# Patient Record
Sex: FEMALE | Race: WHITE | NOT HISPANIC OR LATINO | URBAN - METROPOLITAN AREA
[De-identification: names, ages, dates, MRNs, and addresses within clinical notes are randomized per-mention and may not be internally consistent; named-entity substitution may affect disease eponyms.]

---

## 2018-09-24 ENCOUNTER — FOLLOW UP ESTABLISHED (OUTPATIENT)
Dept: URBAN - METROPOLITAN AREA CLINIC 51 | Facility: CLINIC | Age: 78
End: 2018-09-24
Payer: MEDICARE

## 2018-09-24 DIAGNOSIS — H33.321 ROUND HOLE OF RETINA, RIGHT EYE: ICD-10-CM

## 2018-09-24 DIAGNOSIS — H02.834 DERMATOCHALASIS OF LEFT UPPER LID: ICD-10-CM

## 2018-09-24 DIAGNOSIS — H04.123 TEAR FILM INSUFFICIENCY OF BILATERAL LACRIMAL GLANDS: ICD-10-CM

## 2018-09-24 DIAGNOSIS — H52.4 PRESBYOPIA: ICD-10-CM

## 2018-09-24 PROCEDURE — 92004 COMPRE OPH EXAM NEW PT 1/>: CPT | Performed by: OPTOMETRIST

## 2018-09-24 RX ORDER — ATENOLOL 25 MG/1
25 MG TABLET ORAL
Refills: 0 | Status: ACTIVE
Start: 2018-09-24

## 2018-09-24 ASSESSMENT — INTRAOCULAR PRESSURE
OD: 17
OS: 17

## 2018-09-24 ASSESSMENT — KERATOMETRY
OD: 43.30
OS: 43.33

## 2018-09-24 ASSESSMENT — VISUAL ACUITY
OS: 20/20
OD: 20/20

## 2020-01-13 ENCOUNTER — FOLLOW UP ESTABLISHED (OUTPATIENT)
Dept: URBAN - METROPOLITAN AREA CLINIC 51 | Facility: CLINIC | Age: 80
End: 2020-01-13
Payer: MEDICARE

## 2020-01-13 DIAGNOSIS — H02.831 DERMATOCHALASIS OF RIGHT UPPER EYELID: ICD-10-CM

## 2020-01-13 DIAGNOSIS — H43.813 VITREOUS DEGENERATION, BILATERAL: ICD-10-CM

## 2020-01-13 DIAGNOSIS — H35.363 DRUSEN (DEGENERATIVE) OF MACULA, BILATERAL: ICD-10-CM

## 2020-01-13 PROCEDURE — 92134 CPTRZ OPH DX IMG PST SGM RTA: CPT | Performed by: OPTOMETRIST

## 2020-01-13 PROCEDURE — 92014 COMPRE OPH EXAM EST PT 1/>: CPT | Performed by: OPTOMETRIST

## 2020-01-13 ASSESSMENT — INTRAOCULAR PRESSURE
OD: 17
OS: 16

## 2020-01-13 ASSESSMENT — KERATOMETRY
OS: 43.02
OD: 43.21

## 2020-01-13 ASSESSMENT — VISUAL ACUITY
OD: 20/25
OS: 20/25

## 2022-01-31 ENCOUNTER — OFFICE VISIT (OUTPATIENT)
Dept: URBAN - METROPOLITAN AREA CLINIC 51 | Facility: CLINIC | Age: 82
End: 2022-01-31
Payer: MEDICARE

## 2022-01-31 DIAGNOSIS — H34.8110 CENTRAL RETINAL VEIN OCCLUSION W/ MACULAR EDEMA, RIGHT EYE: Primary | ICD-10-CM

## 2022-01-31 DIAGNOSIS — H43.313 VITREOUS MEMBRANES AND STRANDS, BILATERAL: ICD-10-CM

## 2022-01-31 DIAGNOSIS — Z96.1 PRESENCE OF INTRAOCULAR LENS: ICD-10-CM

## 2022-01-31 PROCEDURE — 92134 CPTRZ OPH DX IMG PST SGM RTA: CPT | Performed by: OPTOMETRIST

## 2022-01-31 PROCEDURE — 99214 OFFICE O/P EST MOD 30 MIN: CPT | Performed by: OPTOMETRIST

## 2022-01-31 ASSESSMENT — KERATOMETRY
OD: 43.27
OS: 43.19

## 2022-01-31 ASSESSMENT — INTRAOCULAR PRESSURE
OS: 16
OD: 16

## 2022-01-31 ASSESSMENT — VISUAL ACUITY
OD: 20/200
OS: 20/25

## 2022-01-31 NOTE — IMPRESSION/PLAN
Impression: Drusen (degenerative) of macula, bilateral
*mild posterior pole OU. Plan: Discussed finding with patient. No treatment is necessary at this time. OCT MAC performed and reviewed. Monitor annually with OCT MAC.

## 2022-01-31 NOTE — IMPRESSION/PLAN
Impression: Round hole of retina, right eye with laser repair/scar temporally *INTACT, repair tear OD Plan: Warning signs of retinal tear and detachment discussed with patient. To return to clinic STAT if any change in symptoms consistent with RT or RD.

## 2022-02-01 ENCOUNTER — OFFICE VISIT (OUTPATIENT)
Dept: URBAN - METROPOLITAN AREA CLINIC 51 | Facility: CLINIC | Age: 82
End: 2022-02-01
Payer: MEDICARE

## 2022-02-01 DIAGNOSIS — E11.9 TYPE 2 DIABETES MELLITUS WITHOUT COMPLICATIONS: ICD-10-CM

## 2022-02-01 PROCEDURE — 92134 CPTRZ OPH DX IMG PST SGM RTA: CPT | Performed by: OPHTHALMOLOGY

## 2022-02-01 PROCEDURE — 99204 OFFICE O/P NEW MOD 45 MIN: CPT | Performed by: OPHTHALMOLOGY

## 2022-02-01 PROCEDURE — 92235 FLUORESCEIN ANGRPH MLTIFRAME: CPT | Performed by: OPHTHALMOLOGY

## 2022-02-01 PROCEDURE — 67028 INJECTION EYE DRUG: CPT | Performed by: OPHTHALMOLOGY

## 2022-02-01 ASSESSMENT — INTRAOCULAR PRESSURE
OS: 18
OD: 17

## 2022-02-01 NOTE — IMPRESSION/PLAN
Impression: Type 2 diabetes mellitus without complications: D13.1. Plan: no sign of retinopathy, no laser indicated. no macular edema. counseled re DM control.

## 2022-02-01 NOTE — IMPRESSION/PLAN
Impression: Central retinal vein occlusion, right eye, with macular edema: H34.8110. Plan: eval and testing confirms CRVO c edema. explained in detail with patient, recommend injection tx. disc r/b/a's, pt elects to proceed with Avastin OD today and will return monthly. urged HTN control RTC 1 month ND Avastin OD

## 2022-03-10 ENCOUNTER — PROCEDURE (OUTPATIENT)
Dept: URBAN - METROPOLITAN AREA CLINIC 44 | Facility: CLINIC | Age: 82
End: 2022-03-10
Payer: MEDICARE

## 2022-03-10 PROCEDURE — 67028 INJECTION EYE DRUG: CPT | Performed by: OPHTHALMOLOGY

## 2022-03-10 PROCEDURE — 92134 CPTRZ OPH DX IMG PST SGM RTA: CPT | Performed by: OPHTHALMOLOGY

## 2022-03-10 ASSESSMENT — INTRAOCULAR PRESSURE
OS: 17
OD: 20

## 2022-04-14 ENCOUNTER — PROCEDURE (OUTPATIENT)
Dept: URBAN - METROPOLITAN AREA CLINIC 44 | Facility: CLINIC | Age: 82
End: 2022-04-14
Payer: MEDICARE

## 2022-04-14 DIAGNOSIS — H34.8110 CENTRAL RETINAL VEIN OCCLUSION, RIGHT EYE, WITH MACULAR EDEMA: Primary | ICD-10-CM

## 2022-04-14 PROCEDURE — 67028 INJECTION EYE DRUG: CPT | Performed by: OPHTHALMOLOGY

## 2022-04-14 PROCEDURE — 92134 CPTRZ OPH DX IMG PST SGM RTA: CPT | Performed by: OPHTHALMOLOGY

## 2022-04-14 NOTE — IMPRESSION/PLAN
Impression: Central retinal vein occlusion, right eye, with macular edema: H34.8110. Plan: inject Avastin OD  **persistent edema, obtain auth for ozurdex RTC 1 month poss FA OD

## 2022-05-12 ENCOUNTER — OFFICE VISIT (OUTPATIENT)
Dept: URBAN - METROPOLITAN AREA CLINIC 44 | Facility: CLINIC | Age: 82
End: 2022-05-12
Payer: MEDICARE

## 2022-05-12 DIAGNOSIS — E11.9 TYPE 2 DIABETES MELLITUS WITHOUT COMPLICATIONS: ICD-10-CM

## 2022-05-12 DIAGNOSIS — H43.813 VITREOUS DEGENERATION, BILATERAL: ICD-10-CM

## 2022-05-12 DIAGNOSIS — H34.8110 CENTRAL RETINAL VEIN OCCLUSION, RIGHT EYE, WITH MACULAR EDEMA: Primary | ICD-10-CM

## 2022-05-12 PROCEDURE — 67028 INJECTION EYE DRUG: CPT | Performed by: OPHTHALMOLOGY

## 2022-05-12 PROCEDURE — 92134 CPTRZ OPH DX IMG PST SGM RTA: CPT | Performed by: OPHTHALMOLOGY

## 2022-05-12 PROCEDURE — 99214 OFFICE O/P EST MOD 30 MIN: CPT | Performed by: OPHTHALMOLOGY

## 2022-05-12 PROCEDURE — 92235 FLUORESCEIN ANGRPH MLTIFRAME: CPT | Performed by: OPHTHALMOLOGY

## 2022-05-12 ASSESSMENT — INTRAOCULAR PRESSURE
OD: 21
OS: 16

## 2022-05-12 NOTE — IMPRESSION/PLAN
Impression: Type 2 diabetes mellitus without complications: E44.1. Plan: no sign of retinopathy, no laser indicated. no macular edema. counseled re DM control.

## 2022-05-12 NOTE — IMPRESSION/PLAN
Impression: Central retinal vein occlusion, right eye, with macular edema: H34.8110. Plan: edema persists OD.  disc options, will inject Ozurdex OD today and return at same interval

RTC 6 weeks

## 2022-06-23 ENCOUNTER — OFFICE VISIT (OUTPATIENT)
Dept: URBAN - METROPOLITAN AREA CLINIC 44 | Facility: CLINIC | Age: 82
End: 2022-06-23
Payer: MEDICARE

## 2022-06-23 DIAGNOSIS — H43.813 VITREOUS DEGENERATION, BILATERAL: ICD-10-CM

## 2022-06-23 DIAGNOSIS — H34.8110 CENTRAL RETINAL VEIN OCCLUSION, RIGHT EYE, WITH MACULAR EDEMA: Primary | ICD-10-CM

## 2022-06-23 DIAGNOSIS — E11.9 TYPE 2 DIABETES MELLITUS WITHOUT COMPLICATIONS: ICD-10-CM

## 2022-06-23 PROCEDURE — 92134 CPTRZ OPH DX IMG PST SGM RTA: CPT | Performed by: OPHTHALMOLOGY

## 2022-06-23 PROCEDURE — 99214 OFFICE O/P EST MOD 30 MIN: CPT | Performed by: OPHTHALMOLOGY

## 2022-06-23 PROCEDURE — 67028 INJECTION EYE DRUG: CPT | Performed by: OPHTHALMOLOGY

## 2022-06-23 ASSESSMENT — INTRAOCULAR PRESSURE
OS: 18
OD: 20

## 2022-06-23 NOTE — IMPRESSION/PLAN
Impression: Type 2 diabetes mellitus without complications: I34.2. Plan: no sign of retinopathy, no laser indicated. no macular edema. counseled re DM control.

## 2022-06-23 NOTE — IMPRESSION/PLAN
Impression: Central retinal vein occlusion, right eye, with macular edema: H34.8110.
s/p Ozurdex OD 5/22 Plan: edema resolved s/p Ozurdex injection 5/22.  will repeat Avastin OD today and return at same interval 

RTC 6 week ND Avastin OD

## 2022-08-04 ENCOUNTER — OFFICE VISIT (OUTPATIENT)
Dept: URBAN - METROPOLITAN AREA CLINIC 44 | Facility: CLINIC | Age: 82
End: 2022-08-04
Payer: MEDICARE

## 2022-08-04 DIAGNOSIS — H34.8110 CENTRAL RETINAL VEIN OCCLUSION, RIGHT EYE, WITH MACULAR EDEMA: Primary | ICD-10-CM

## 2022-08-04 PROCEDURE — 67028 INJECTION EYE DRUG: CPT | Performed by: OPHTHALMOLOGY

## 2022-08-04 PROCEDURE — 92134 CPTRZ OPH DX IMG PST SGM RTA: CPT | Performed by: OPHTHALMOLOGY

## 2022-08-04 ASSESSMENT — INTRAOCULAR PRESSURE
OS: 19
OD: 18

## 2022-08-04 NOTE — IMPRESSION/PLAN
Impression: Central retinal vein occlusion, right eye, with macular edema: H34.8110.
s/p Ozurdex OD 5/22 Plan: inject Avastin OD

RTC 6 week poss FA OD

## 2022-09-15 ENCOUNTER — PROCEDURE (OUTPATIENT)
Dept: URBAN - METROPOLITAN AREA CLINIC 44 | Facility: CLINIC | Age: 82
End: 2022-09-15
Payer: MEDICARE

## 2022-09-15 DIAGNOSIS — H43.813 VITREOUS DEGENERATION, BILATERAL: ICD-10-CM

## 2022-09-15 DIAGNOSIS — H34.8110 CENTRAL RETINAL VEIN OCCLUSION, RIGHT EYE, WITH MACULAR EDEMA: Primary | ICD-10-CM

## 2022-09-15 DIAGNOSIS — E11.9 TYPE 2 DIABETES MELLITUS WITHOUT COMPLICATIONS: ICD-10-CM

## 2022-09-15 PROCEDURE — 67028 INJECTION EYE DRUG: CPT | Performed by: OPHTHALMOLOGY

## 2022-09-15 PROCEDURE — 92235 FLUORESCEIN ANGRPH MLTIFRAME: CPT | Performed by: OPHTHALMOLOGY

## 2022-09-15 PROCEDURE — 92134 CPTRZ OPH DX IMG PST SGM RTA: CPT | Performed by: OPHTHALMOLOGY

## 2022-09-15 PROCEDURE — 99214 OFFICE O/P EST MOD 30 MIN: CPT | Performed by: OPHTHALMOLOGY

## 2022-09-15 ASSESSMENT — INTRAOCULAR PRESSURE
OD: 20
OS: 19

## 2022-11-10 ENCOUNTER — PROCEDURE (OUTPATIENT)
Dept: URBAN - METROPOLITAN AREA CLINIC 44 | Facility: CLINIC | Age: 82
End: 2022-11-10
Payer: MEDICARE

## 2022-11-10 DIAGNOSIS — H34.8110 CENTRAL RETINAL VEIN OCCLUSION, RIGHT EYE, WITH MACULAR EDEMA: Primary | ICD-10-CM

## 2022-11-10 PROCEDURE — 67028 INJECTION EYE DRUG: CPT | Performed by: OPHTHALMOLOGY

## 2022-11-10 PROCEDURE — 92134 CPTRZ OPH DX IMG PST SGM RTA: CPT | Performed by: OPHTHALMOLOGY

## 2022-11-10 ASSESSMENT — INTRAOCULAR PRESSURE
OS: 14
OD: 14

## 2022-11-10 NOTE — IMPRESSION/PLAN
Impression: Central retinal vein occlusion, right eye, with macular edema: H34.8110.
s/p Ozurdex OD 5/22 Plan: inject Avastin OD

RTC 8 week poss FA OD

## 2023-01-19 ENCOUNTER — OFFICE VISIT (OUTPATIENT)
Dept: URBAN - METROPOLITAN AREA CLINIC 44 | Facility: CLINIC | Age: 83
End: 2023-01-19
Payer: MEDICARE

## 2023-01-19 DIAGNOSIS — H43.813 VITREOUS DEGENERATION, BILATERAL: ICD-10-CM

## 2023-01-19 DIAGNOSIS — E11.9 TYPE 2 DIABETES MELLITUS WITHOUT COMPLICATIONS: ICD-10-CM

## 2023-01-19 DIAGNOSIS — H34.8110 CENTRAL RETINAL VEIN OCCLUSION, RIGHT EYE, WITH MACULAR EDEMA: Primary | ICD-10-CM

## 2023-01-19 PROCEDURE — 92235 FLUORESCEIN ANGRPH MLTIFRAME: CPT | Performed by: OPHTHALMOLOGY

## 2023-01-19 PROCEDURE — 92134 CPTRZ OPH DX IMG PST SGM RTA: CPT | Performed by: OPHTHALMOLOGY

## 2023-01-19 PROCEDURE — 99214 OFFICE O/P EST MOD 30 MIN: CPT | Performed by: OPHTHALMOLOGY

## 2023-01-19 PROCEDURE — 67028 INJECTION EYE DRUG: CPT | Performed by: OPHTHALMOLOGY

## 2023-01-19 ASSESSMENT — INTRAOCULAR PRESSURE
OD: 13
OS: 15

## 2023-01-19 NOTE — IMPRESSION/PLAN
Impression: Type 2 diabetes mellitus without complications: D57.3. Plan: no sign of retinopathy, no laser indicated. no macular edema. counseled re DM control.

## 2023-01-19 NOTE — IMPRESSION/PLAN
Impression: Central retinal vein occlusion, right eye, with macular edema: H34.8110.
s/p Ozurdex OD 5/22 Plan: eval and testing shows significant ischemia, edema resolved s/p Ozurdex injection 5/22. even though edema resolves with ozurdex injection, patient does not notice vision improvement, nor does she notice decline with worsening edema. high risk for NVG. goal is comfort care, understands guarded visual prognosis. disc options, will repeat Avastin OD today and return later RTC 3 months poss FA OD

## 2023-03-30 ENCOUNTER — OFFICE VISIT (OUTPATIENT)
Dept: URBAN - METROPOLITAN AREA CLINIC 44 | Facility: CLINIC | Age: 83
End: 2023-03-30
Payer: MEDICARE

## 2023-03-30 DIAGNOSIS — H34.8110 CENTRAL RETINAL VEIN OCCLUSION, RIGHT EYE, WITH MACULAR EDEMA: Primary | ICD-10-CM

## 2023-03-30 DIAGNOSIS — E11.9 TYPE 2 DIABETES MELLITUS WITHOUT COMPLICATIONS: ICD-10-CM

## 2023-03-30 DIAGNOSIS — H43.813 VITREOUS DEGENERATION, BILATERAL: ICD-10-CM

## 2023-03-30 PROCEDURE — 99214 OFFICE O/P EST MOD 30 MIN: CPT | Performed by: OPHTHALMOLOGY

## 2023-03-30 PROCEDURE — 67028 INJECTION EYE DRUG: CPT | Performed by: OPHTHALMOLOGY

## 2023-03-30 PROCEDURE — 92134 CPTRZ OPH DX IMG PST SGM RTA: CPT | Performed by: OPHTHALMOLOGY

## 2023-03-30 ASSESSMENT — INTRAOCULAR PRESSURE
OS: 18
OD: 17

## 2023-03-30 NOTE — IMPRESSION/PLAN
Impression: Central retinal vein occlusion, right eye, with macular edema: H34.8110.
s/p Ozurdex OD 5/22 Plan: eval and testing shows significant ischemia, CME. even though edema resolves with ozurdex injection, patient does not notice vision improvement, nor does she notice decline with worsening edema. high risk for NVG. goal is comfort care, understands guarded visual prognosis.  disc options, will repeat Avastin OD today and return at same interval

RTC 3 months poss FA OD SERTRALINE HCL TABS 50MG      Last Written Prescription Date:  02/26/18  Last Fill Quantity: 180,   # refills: 3  Last Office Visit: 10/12/18  Future Office visit:  None scheduled.  Routing refill request to provider for review/approval because:  SSRIs Protocol Failed1/6 11:45 PM   No PHQ-9 score less than 5 in past 6 months   Unable to fill medication because patient is due for PHQ9. Please fill, and sign as appropriate. Thank you!  Maylin Calhoun RN on 1/8/2019 at 4:13 PM

## 2023-03-30 NOTE — IMPRESSION/PLAN
Impression: Type 2 diabetes mellitus without complications: O74.0. Plan: no sign of retinopathy, no laser indicated. no macular edema. counseled re DM control.

## 2023-06-29 ENCOUNTER — OFFICE VISIT (OUTPATIENT)
Dept: URBAN - METROPOLITAN AREA CLINIC 44 | Facility: CLINIC | Age: 83
End: 2023-06-29
Payer: MEDICARE

## 2023-06-29 DIAGNOSIS — H20.011 PRIMARY IRIDOCYCLITIS OF RIGHT EYE: Primary | ICD-10-CM

## 2023-06-29 PROCEDURE — 99214 OFFICE O/P EST MOD 30 MIN: CPT | Performed by: OPTOMETRIST

## 2023-06-29 RX ORDER — PREDNISOLONE ACETATE 10 MG/ML
1 % SUSPENSION/ DROPS OPHTHALMIC
Qty: 5 | Refills: 1 | Status: ACTIVE
Start: 2023-06-29

## 2023-06-29 RX ORDER — ATENOLOL 25 MG/1
25 MG TABLET ORAL
Refills: 0 | Status: ACTIVE
Start: 2023-06-29

## 2023-06-29 ASSESSMENT — INTRAOCULAR PRESSURE
OD: 44
OS: 16

## 2023-06-29 NOTE — IMPRESSION/PLAN
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 Plan: PF qid OD and taper 1 gtt per wk then D/C.

## 2023-07-06 ENCOUNTER — OFFICE VISIT (OUTPATIENT)
Dept: URBAN - METROPOLITAN AREA CLINIC 44 | Facility: CLINIC | Age: 83
End: 2023-07-06
Payer: MEDICARE

## 2023-07-06 DIAGNOSIS — H43.813 VITREOUS DEGENERATION, BILATERAL: ICD-10-CM

## 2023-07-06 DIAGNOSIS — E11.9 TYPE 2 DIABETES MELLITUS WITHOUT COMPLICATIONS: ICD-10-CM

## 2023-07-06 DIAGNOSIS — H34.8110 CENTRAL RETINAL VEIN OCCLUSION, RIGHT EYE, WITH MACULAR EDEMA: Primary | ICD-10-CM

## 2023-07-06 PROCEDURE — 99214 OFFICE O/P EST MOD 30 MIN: CPT | Performed by: OPHTHALMOLOGY

## 2023-07-06 PROCEDURE — 67028 INJECTION EYE DRUG: CPT | Performed by: OPHTHALMOLOGY

## 2023-07-06 PROCEDURE — 92235 FLUORESCEIN ANGRPH MLTIFRAME: CPT | Performed by: OPHTHALMOLOGY

## 2023-07-06 PROCEDURE — 92134 CPTRZ OPH DX IMG PST SGM RTA: CPT | Performed by: OPHTHALMOLOGY

## 2023-07-06 RX ORDER — PREDNISOLONE ACETATE 10 MG/ML
1 % SUSPENSION/ DROPS OPHTHALMIC
Qty: 10 | Refills: 1 | Status: ACTIVE
Start: 2023-07-06

## 2023-07-06 ASSESSMENT — INTRAOCULAR PRESSURE
OS: 23
OD: 43

## 2023-07-06 NOTE — IMPRESSION/PLAN
Impression: Central retinal vein occlusion, right eye, with macular edema: H34.8110.
s/p Ozurdex OD 5/22 Plan: eval and testing shows significant ischemia, CME. even though edema resolves with Ozurdex injection, patient does not notice vision improvement, nor does she notice decline with worsening edema.  goal is comfort care, understands guarded visual prognosis. NEW NVG/rubeosis. IOP 43. will inject Avastin OD today c AC tap. post tap IOP 27. pressure patch applied ok to remove tonight. cont PF QID. send for urgent consult with glaucoma for possible DIODE laser RTC ASAP glaucoma, 1 month Syed Milligan

## 2023-07-06 NOTE — IMPRESSION/PLAN
Impression: Type 2 diabetes mellitus without complications: E53.5. Plan: no sign of retinopathy, no laser indicated. no macular edema. counseled re DM control.

## 2023-10-20 NOTE — IMPRESSION/PLAN
Impression: Central retinal vein occlusion, right eye, with macular edema: H34.8110.
s/p Ozurdex OD 5/22 Plan: edema resolved s/p Ozurdex injection 5/22. even though edema resolves with ozurdex injection, patient does not notice vision improvement, nor does she notice decline with worsening edema. will repeat Avastin OD today and return later RTC 8 week ND Avastin OD
Impression: Type 2 diabetes mellitus without complications: J56.4. Plan: no sign of retinopathy, no laser indicated. no macular edema. counseled re DM control.
Impression: Vitreous degeneration, bilateral: H43.813. Plan: no retinal breaks detected. s/sx RD and tears reviewed. call if any new symptoms develop.
4 = No assist / stand by assistance
